# Patient Record
Sex: MALE | Race: WHITE | NOT HISPANIC OR LATINO | URBAN - METROPOLITAN AREA
[De-identification: names, ages, dates, MRNs, and addresses within clinical notes are randomized per-mention and may not be internally consistent; named-entity substitution may affect disease eponyms.]

---

## 2020-06-09 ENCOUNTER — APPOINTMENT (RX ONLY)
Dept: URBAN - METROPOLITAN AREA CLINIC 348 | Facility: CLINIC | Age: 35
Setting detail: DERMATOLOGY
End: 2020-06-09

## 2020-06-09 DIAGNOSIS — D485 NEOPLASM OF UNCERTAIN BEHAVIOR OF SKIN: ICD-10-CM

## 2020-06-09 PROBLEM — D48.5 NEOPLASM OF UNCERTAIN BEHAVIOR OF SKIN: Status: ACTIVE | Noted: 2020-06-09

## 2020-06-09 PROCEDURE — ? BIOPSY BY SHAVE METHOD

## 2020-06-09 PROCEDURE — 11102 TANGNTL BX SKIN SINGLE LES: CPT

## 2020-06-09 ASSESSMENT — LOCATION ZONE DERM: LOCATION ZONE: TRUNK

## 2020-06-09 ASSESSMENT — LOCATION DETAILED DESCRIPTION DERM: LOCATION DETAILED: LEFT INFERIOR MEDIAL UPPER BACK

## 2020-06-09 ASSESSMENT — LOCATION SIMPLE DESCRIPTION DERM: LOCATION SIMPLE: LEFT UPPER BACK

## 2020-06-09 NOTE — PROCEDURE: BIOPSY BY SHAVE METHOD
Bill For Surgical Tray: no
Anesthesia Volume In Cc (Will Not Render If 0): 0.5
Consent: Verbal consent was obtained and risks were reviewed including but not limited to scarring, infection, bleeding, scabbing, incomplete removal, nerve damage and allergy to anesthesia.
Information: Selecting Yes will display possible errors in your note based on the variables you have selected. This validation is only offered as a suggestion for you. PLEASE NOTE THAT THE VALIDATION TEXT WILL BE REMOVED WHEN YOU FINALIZE YOUR NOTE. IF YOU WANT TO FAX A PRELIMINARY NOTE YOU WILL NEED TO TOGGLE THIS TO 'NO' IF YOU DO NOT WANT IT IN YOUR FAXED NOTE.
Biopsy Type: H and E
Electrodesiccation Text: The wound bed was treated with electrodesiccation after the biopsy was performed.
X Size Of Lesion In Cm: 0
Anesthesia Type: 1% lidocaine with 1:100,000 epinephrine and a 1:6 solution of 8.4% sodium bicarbonate
Depth Of Biopsy: dermis
Silver Nitrate Text: The wound bed was treated with silver nitrate after the biopsy was performed.
Detail Level: Detailed
Billing Type: Third-Party Bill
Hemostasis: Aluminum Chloride
Type Of Destruction Used: Curettage
Cryotherapy Text: The wound bed was treated with cryotherapy after the biopsy was performed.
Dressing: bandage
Curettage Text: The wound bed was treated with curettage after the biopsy was performed.
Notification Instructions: Signed consent obtained for patient to return to office in 2-4 week for biopsy results. Patient understands results will not be given over the phone.
Wound Care: Aquaphor
Was A Bandage Applied: Yes
Post-Care Instructions: I reviewed with the patient in detail post-care instructions. Patient is to keep the biopsy site dry overnight, and then apply aquaphor  twice daily until healed. Patient may apply hydrogen peroxide soaks to remove any crusting.
Electrodesiccation And Curettage Text: The wound bed was treated with electrodesiccation and curettage after the biopsy was performed.

## 2020-06-18 ENCOUNTER — APPOINTMENT (RX ONLY)
Dept: URBAN - METROPOLITAN AREA CLINIC 348 | Facility: CLINIC | Age: 35
Setting detail: DERMATOLOGY
End: 2020-06-18

## 2020-06-18 DIAGNOSIS — D22 MELANOCYTIC NEVI: ICD-10-CM

## 2020-06-18 PROBLEM — D22.5 MELANOCYTIC NEVI OF TRUNK: Status: ACTIVE | Noted: 2020-06-18

## 2020-06-18 PROCEDURE — ? COUNSELING

## 2020-06-18 PROCEDURE — 99213 OFFICE O/P EST LOW 20 MIN: CPT

## 2020-06-18 PROCEDURE — ? CONSENT FOR TELEMEDICINE VISIT OBTAINED

## 2020-06-18 PROCEDURE — ? DEFER

## 2020-06-18 ASSESSMENT — LOCATION ZONE DERM: LOCATION ZONE: TRUNK

## 2020-06-18 ASSESSMENT — LOCATION DETAILED DESCRIPTION DERM
LOCATION DETAILED: RIGHT MID-UPPER BACK
LOCATION DETAILED: RIGHT LATERAL UPPER BACK

## 2020-06-18 ASSESSMENT — LOCATION SIMPLE DESCRIPTION DERM: LOCATION SIMPLE: RIGHT UPPER BACK

## 2020-06-18 NOTE — PROCEDURE: DEFER
Introduction Text (Please End With A Colon): The following procedure was deferred:excision
Detail Level: Detailed

## 2020-07-01 ENCOUNTER — APPOINTMENT (RX ONLY)
Dept: URBAN - METROPOLITAN AREA CLINIC 348 | Facility: CLINIC | Age: 35
Setting detail: DERMATOLOGY
End: 2020-07-01

## 2020-07-01 DIAGNOSIS — D22 MELANOCYTIC NEVI: ICD-10-CM

## 2020-07-01 PROBLEM — D22.5 MELANOCYTIC NEVI OF TRUNK: Status: ACTIVE | Noted: 2020-07-01

## 2020-07-01 PROCEDURE — ? SHAVE REMOVAL

## 2020-07-01 PROCEDURE — 11302 SHAVE SKIN LESION 1.1-2.0 CM: CPT

## 2020-07-01 ASSESSMENT — LOCATION DETAILED DESCRIPTION DERM: LOCATION DETAILED: LEFT INFERIOR MEDIAL UPPER BACK

## 2020-07-01 ASSESSMENT — LOCATION ZONE DERM: LOCATION ZONE: TRUNK

## 2020-07-01 ASSESSMENT — LOCATION SIMPLE DESCRIPTION DERM: LOCATION SIMPLE: LEFT UPPER BACK

## 2020-07-01 NOTE — PROCEDURE: SHAVE REMOVAL
Post-Care Instructions: I reviewed with the patient in detail post-care instructions. Patient is to keep the biopsy site dry overnight, and then apply bacitracin twice daily until healed. Patient may apply hydrogen peroxide soaks to remove any crusting.
Render Path Notes In Note?: No
Size Of Lesion In Cm (Required): 1.1
Detail Level: Detailed
Medical Necessity Clause: This procedure was medically necessary because the lesion that was treated was:
Biopsy Method: Dermablade
Hemostasis: Electrocautery
Notification Instructions: Patient will return to clinic in 2-4 weeks for biopsy results.
Path Notes (To The Dermatopathologist): Please check margins.
Anesthesia Type: 1% lidocaine without epinephrine
Medical Necessity Information: It is in your best interest to select a reason for this procedure from the list below. All of these items fulfill various CMS LCD requirements except the new and changing color options.
Consent was obtained from the patient. The risks and benefits to therapy were discussed in detail. Specifically, the risks of infection, scarring, bleeding, prolonged wound healing, incomplete removal, allergy to anesthesia, nerve injury and recurrence were addressed. Prior to the procedure, the treatment site was clearly identified and confirmed by the patient. All components of Universal Protocol/PAUSE Rule completed.
Was A Bandage Applied: Yes
Wound Care: Aquaphor
Billing Type: Third-Party Bill
Anesthesia Volume In Cc: 1
X Size Of Lesion In Cm (Optional): 0

## 2021-07-29 ENCOUNTER — APPOINTMENT (RX ONLY)
Dept: URBAN - METROPOLITAN AREA CLINIC 348 | Facility: CLINIC | Age: 36
Setting detail: DERMATOLOGY
End: 2021-07-29

## 2021-07-29 DIAGNOSIS — Z87.2 PERSONAL HISTORY OF DISEASES OF THE SKIN AND SUBCUTANEOUS TISSUE: ICD-10-CM

## 2021-07-29 DIAGNOSIS — L72.8 OTHER FOLLICULAR CYSTS OF THE SKIN AND SUBCUTANEOUS TISSUE: ICD-10-CM

## 2021-07-29 DIAGNOSIS — L81.4 OTHER MELANIN HYPERPIGMENTATION: ICD-10-CM

## 2021-07-29 DIAGNOSIS — D22 MELANOCYTIC NEVI: ICD-10-CM

## 2021-07-29 PROBLEM — D22.5 MELANOCYTIC NEVI OF TRUNK: Status: ACTIVE | Noted: 2021-07-29

## 2021-07-29 PROCEDURE — ? OTHER

## 2021-07-29 PROCEDURE — 99213 OFFICE O/P EST LOW 20 MIN: CPT

## 2021-07-29 PROCEDURE — ? COUNSELING

## 2021-07-29 ASSESSMENT — LOCATION SIMPLE DESCRIPTION DERM
LOCATION SIMPLE: LEFT UPPER BACK
LOCATION SIMPLE: RIGHT UPPER BACK
LOCATION SIMPLE: LEFT CHEEK

## 2021-07-29 ASSESSMENT — LOCATION DETAILED DESCRIPTION DERM
LOCATION DETAILED: LEFT MID-UPPER BACK
LOCATION DETAILED: RIGHT MID-UPPER BACK
LOCATION DETAILED: LEFT INFERIOR MEDIAL UPPER BACK
LOCATION DETAILED: LEFT CENTRAL MALAR CHEEK

## 2021-07-29 ASSESSMENT — LOCATION ZONE DERM
LOCATION ZONE: TRUNK
LOCATION ZONE: FACE

## 2021-07-29 NOTE — PROCEDURE: OTHER
Other (Free Text): One lesion was injected with 0.1 cc of K3.  The risks of atrophy were reviewed with the patient.  Lot #: [id number] Exp: Aug 2022 Other (Free Text): One lesion was injected with 0.1 cc of K3.  The risks of atrophy were reviewed with the patient.  Lot #: [id number] Exp: Aug 2022